# Patient Record
Sex: FEMALE | Race: WHITE | NOT HISPANIC OR LATINO | Employment: OTHER | URBAN - METROPOLITAN AREA
[De-identification: names, ages, dates, MRNs, and addresses within clinical notes are randomized per-mention and may not be internally consistent; named-entity substitution may affect disease eponyms.]

---

## 2018-02-07 ENCOUNTER — HOSPITAL ENCOUNTER (OUTPATIENT)
Dept: RADIOLOGY | Facility: MEDICAL CENTER | Age: 78
End: 2018-02-07

## 2018-02-09 ENCOUNTER — HOSPITAL ENCOUNTER (OUTPATIENT)
Dept: RADIATION ONCOLOGY | Facility: MEDICAL CENTER | Age: 78
End: 2018-02-28
Attending: RADIOLOGY
Payer: MEDICARE

## 2018-02-09 VITALS
SYSTOLIC BLOOD PRESSURE: 162 MMHG | BODY MASS INDEX: 23.72 KG/M2 | OXYGEN SATURATION: 90 % | HEART RATE: 75 BPM | TEMPERATURE: 98.2 F | RESPIRATION RATE: 18 BRPM | DIASTOLIC BLOOD PRESSURE: 75 MMHG | HEIGHT: 62 IN | WEIGHT: 128.9 LBS

## 2018-02-09 DIAGNOSIS — R91.1 LUNG NODULE: ICD-10-CM

## 2018-02-09 DIAGNOSIS — R91.1 LUNG NODULE, SOLITARY: ICD-10-CM

## 2018-02-09 PROCEDURE — 99214 OFFICE O/P EST MOD 30 MIN: CPT | Performed by: RADIOLOGY

## 2018-02-09 PROCEDURE — 99205 OFFICE O/P NEW HI 60 MIN: CPT | Performed by: RADIOLOGY

## 2018-02-09 RX ORDER — MAGNESIUM OXIDE 400 MG/1
400 TABLET ORAL DAILY
COMMUNITY

## 2018-02-09 RX ORDER — ISOSORBIDE DINITRATE 30 MG/1
30 TABLET ORAL 4 TIMES DAILY
COMMUNITY

## 2018-02-09 RX ORDER — ROSUVASTATIN CALCIUM 20 MG/1
20 TABLET, COATED ORAL EVERY EVENING
COMMUNITY
End: 2018-02-09

## 2018-02-09 RX ORDER — AMLODIPINE BESYLATE 5 MG/1
5 TABLET ORAL DAILY
COMMUNITY
End: 2018-02-09

## 2018-02-09 RX ORDER — LORATADINE 10 MG/1
10 TABLET ORAL DAILY
COMMUNITY

## 2018-02-09 RX ORDER — METOPROLOL SUCCINATE 25 MG/1
25 TABLET, EXTENDED RELEASE ORAL DAILY
COMMUNITY

## 2018-02-09 RX ORDER — OMEPRAZOLE 20 MG/1
20 CAPSULE, DELAYED RELEASE ORAL DAILY
COMMUNITY

## 2018-02-09 RX ORDER — HYDROCHLOROTHIAZIDE 12.5 MG/1
12.5 TABLET ORAL DAILY
COMMUNITY

## 2018-02-09 RX ORDER — LOVASTATIN 20 MG/1
10 TABLET ORAL NIGHTLY
COMMUNITY

## 2018-02-09 NOTE — CONSULTS
RADIATION ONCOLOGY CONSULT    DATE OF SERVICE: 2/9/2018    IDENTIFICATION: A 77 y.o. female with growing nodule left upper lobe poor candidate for biopsy.  She is here at the kind request of Dr. Moon for radiotherapy evaluation.      HISTORY OF PRESENT ILLNESS: Patient has a 20-pack-year smoking history stopped in late 90s. She was noted to have a small nodule in the left upper lobe which has been followed serially with CAT scans. More recent imaging January 2018 shows a near doubling in the size of the left upper lobe mass now measuring 12 mm in long axis. Findings are suspicious for neoplasm. PET/CT recommended.    She is asymptomatic.    PAST MEDICAL HISTORY:   Past Medical History:   Diagnosis Date   • GERD (gastroesophageal reflux disease)    • Hyperlipidemia    • Hypertension    • Lung nodule    • Oxygen dependent        PAST SURGICAL HISTORY:  Past Surgical History:   Procedure Laterality Date   • OTHER      nephrectomy   • PARATHYROIDECTOMY     • STENT PLACEMENT     • TONSILLECTOMY     • TUBAL LIGATION         CURRENT MEDICATIONS:  Current Outpatient Prescriptions   Medication Sig Dispense Refill   • omeprazole (PRILOSEC) 20 MG delayed-release capsule Take 20 mg by mouth every day.     • metoprolol SR (TOPROL XL) 25 MG TABLET SR 24 HR Take 25 mg by mouth every day.     • isosorbide dinitrate (ISORDIL) 30 MG Tab Take 30 mg by mouth 4 times a day.     • Mometasone Furo-Formoterol Fum (DULERA INH) Inhale  by mouth.     • lovastatin (MEVACOR) 20 MG Tab Take 20 mg by mouth every evening.     • loratadine (CLARITIN) 10 MG Tab Take 10 mg by mouth every day.     • hydroCHLOROthiazide (HYDRODIURIL) 12.5 MG tablet Take 12.5 mg by mouth every day.     • magnesium oxide (MAG-OX) 400 MG Tab Take 400 mg by mouth every day.     • aspirin EC (ECOTRIN) 81 MG Tablet Delayed Response Take 81 mg by mouth every day.       No current facility-administered medications for this encounter.        ALLERGIES:    Patient has no  "allergy information on record.    FAMILY HISTORY:    Maternal aunt - cancer of the mouth    SOCIAL HISTORY:     reports that she quit smoking about 22 years ago. She has a 20.00 pack-year smoking history. She has never used smokeless tobacco. She reports that she drinks alcohol. She reports that she does not use drugs.   Patient is , has 4 children and lives in [place], [place]. Patient is a retired cook.    REVIEW OF SYSTEMS:  A review of systems for today's date of service was reviewed and uploaded into the electronic medical record.      Pain Scale: 0-10  Pain Assessement: 8  Pain Location, Orientation and Scale: right knee on/off last couple weeks (patient will contact her PCP for work-up)  What makes the pain better: sitting  What makes the pain worse: walking    Gynecological History:   & Para:   : 5, Para: 4 and Number of Interrupted Pregnancies: 1  Menopause Status: Post  BCP- less then 1 year, HRT - never used      PHYSICAL EXAM:    ECOG PERFORMANCE STATUS:  1= Restricted in physically strenuous activity, but ambulatory and able to carry out work of a light sedentary nature, e.g., light housework, office work.   BP (!) 162/75   Pulse 75   Temp 36.8 °C (98.2 °F)   Resp 18   Ht 1.562 m (5' 1.5\")   Wt 58.5 kg (128 lb 14.4 oz)   SpO2 90%   BMI 23.96 kg/m²   GENERAL: Alert and oriented no acute distress  HEENT:  Pupils are equal, round, and reactive to light.  Extraocular muscles   are intact. Sclerae nonicteric.  Conjunctivae pink.  Oral cavity, tongue   protrudes midline.   NECK:  Supple without evidence of thyromegaly.  NODES:  No peripheral adenopathy of the neck, supraclavicular fossa or axillae   bilaterally.  LUNGS:  Clear to ascultation and resonant to percussion.  HEART:  Regular rate and rhythm.  No murmur appreciated  ABDOMEN:  Soft. No evidence of hepatosplenomegaly.  Positive bowel sounds.  EXTREMITIES:  Without Edema.  NEUROLOGIC:  Cranial nerves II through XII were " intact.  Strength is 5/5 in   lower extremities bilaterally.  DTRs were symmetrical.  There was no focal   sensory deficit appreciated.    RADIOLOGY DATA:        IMPRESSION:    A 77 y.o. with presumed stage I lung cancer left upper lobe.    RECOMMENDATIONS:   Will obtain PET CT to ensure there is hypermetabolic activity within the nodule. If metabolically active and considering serial growth most likely malignancy. Dr. Moon's indicated the patient's poor risk for biopsy. Did discuss using the empiric data to treat as stage I lung cancer with stereotactic body radiotherapy. This will involve delivering 6000 cGy in 5 fractions over 2 weeks. She may experience some rib discomfort 12-18 months post completion of therapy that should resolve spontaneously. I don't anticipate any other adverse effects. After discussions, patient would like to proceed. She'll undergo PET scanning next week and return for simulation February 20 with treatment anticipated to get started by February 28.    One hour was spent face-to-face with patient in the office and more than half of that time was spent counseling patient or coordinating care as described above.    Thank you for the opportunity to participate in her care.  If any questions or comments, please do not hesitate in calling.    Dawna MELTON M.D.  Electronically signed by: Dawna Kerr V, 2/9/2018 2:39 PM  [phone]

## 2018-02-09 NOTE — NON-PROVIDER
"Patient was seen today in clinic with Dr. Kerr for Lung Nodule.  Vitals signs and weight were obtained and pain assessment was completed.  Allergies and medications were reviewed with the patient.  Review of systems completed.     Vitals/Pain:  Vitals:    02/09/18 1252   BP: (!) 162/75   Pulse: 75   Resp: 18   Temp: 36.8 °C (98.2 °F)   SpO2: 90%   Weight: 58.5 kg (128 lb 14.4 oz)   Height: 1.562 m (5' 1.5\")        Pain Scale: 0-10  Pain Assessement: 8  Pain Location, Orientation and Scale: right knee on/off last couple weeks (patient will contact her PCP for work-up)  What makes the pain better: sitting  What makes the pain worse: walking      Allergies:   Contrast media with iodine [iodine]; Spiriva; and Statins [hmg-coa-r inhibitors]    Current Medications:  Current Outpatient Prescriptions   Medication Sig Dispense Refill   • omeprazole (PRILOSEC) 20 MG delayed-release capsule Take 20 mg by mouth every day.     • metoprolol SR (TOPROL XL) 25 MG TABLET SR 24 HR Take 25 mg by mouth every day.     • isosorbide dinitrate (ISORDIL) 30 MG Tab Take 30 mg by mouth 4 times a day.     • Mometasone Furo-Formoterol Fum (DULERA INH) Inhale  by mouth.     • lovastatin (MEVACOR) 20 MG Tab Take 20 mg by mouth every evening.     • loratadine (CLARITIN) 10 MG Tab Take 10 mg by mouth every day.     • hydroCHLOROthiazide (HYDRODIURIL) 12.5 MG tablet Take 12.5 mg by mouth every day.     • magnesium oxide (MAG-OX) 400 MG Tab Take 400 mg by mouth every day.     • aspirin EC (ECOTRIN) 81 MG Tablet Delayed Response Take 81 mg by mouth every day.       No current facility-administered medications for this encounter.          PCP:  Luann Rogers R.N.  "

## 2018-02-15 ENCOUNTER — HOSPITAL ENCOUNTER (OUTPATIENT)
Dept: RADIOLOGY | Facility: MEDICAL CENTER | Age: 78
End: 2018-02-15
Attending: RADIOLOGY
Payer: MEDICARE

## 2018-02-15 DIAGNOSIS — R91.1 LUNG NODULE, SOLITARY: ICD-10-CM

## 2018-02-15 PROCEDURE — A9552 F18 FDG: HCPCS

## 2018-02-16 ENCOUNTER — PATIENT OUTREACH (OUTPATIENT)
Dept: OTHER | Facility: MEDICAL CENTER | Age: 78
End: 2018-02-16

## 2018-02-20 ENCOUNTER — HOSPITAL ENCOUNTER (OUTPATIENT)
Dept: RADIATION ONCOLOGY | Facility: MEDICAL CENTER | Age: 78
End: 2018-02-20

## 2018-02-20 PROCEDURE — 77334 RADIATION TREATMENT AID(S): CPT | Mod: 26 | Performed by: RADIOLOGY

## 2018-02-20 PROCEDURE — 77470 SPECIAL RADIATION TREATMENT: CPT | Mod: 26 | Performed by: RADIOLOGY

## 2018-02-20 PROCEDURE — 77263 THER RADIOLOGY TX PLNG CPLX: CPT | Performed by: RADIOLOGY

## 2018-02-20 PROCEDURE — 77470 SPECIAL RADIATION TREATMENT: CPT | Performed by: RADIOLOGY

## 2018-02-20 PROCEDURE — 77334 RADIATION TREATMENT AID(S): CPT | Performed by: RADIOLOGY

## 2018-02-20 PROCEDURE — 77290 THER RAD SIMULAJ FIELD CPLX: CPT | Mod: 26 | Performed by: RADIOLOGY

## 2018-02-20 PROCEDURE — 77290 THER RAD SIMULAJ FIELD CPLX: CPT | Performed by: RADIOLOGY

## 2018-02-21 ENCOUNTER — PATIENT OUTREACH (OUTPATIENT)
Dept: OTHER | Facility: MEDICAL CENTER | Age: 78
End: 2018-02-21

## 2018-02-21 NOTE — PROGRESS NOTES
"Call placed to patient for follow up on cancer nurse navigation letter that was sent.  Pt reporting \"everyone has been wonderful and explained everything\".  Pt denies needs or questions at this time. Provided contact information if needs arise.  "

## 2018-02-22 PROCEDURE — 77370 RADIATION PHYSICS CONSULT: CPT | Performed by: RADIOLOGY

## 2018-02-26 PROCEDURE — 77295 3-D RADIOTHERAPY PLAN: CPT | Mod: 26 | Performed by: RADIOLOGY

## 2018-02-26 PROCEDURE — 77334 RADIATION TREATMENT AID(S): CPT | Mod: 26 | Performed by: RADIOLOGY

## 2018-02-26 PROCEDURE — 77295 3-D RADIOTHERAPY PLAN: CPT | Performed by: RADIOLOGY

## 2018-02-26 PROCEDURE — 77293 RESPIRATOR MOTION MGMT SIMUL: CPT | Mod: 26 | Performed by: RADIOLOGY

## 2018-02-26 PROCEDURE — 77334 RADIATION TREATMENT AID(S): CPT | Performed by: RADIOLOGY

## 2018-02-26 PROCEDURE — 77293 RESPIRATOR MOTION MGMT SIMUL: CPT | Performed by: RADIOLOGY

## 2018-02-26 PROCEDURE — 77300 RADIATION THERAPY DOSE PLAN: CPT | Performed by: RADIOLOGY

## 2018-02-26 PROCEDURE — 77300 RADIATION THERAPY DOSE PLAN: CPT | Mod: 26 | Performed by: RADIOLOGY

## 2018-02-28 ENCOUNTER — HOSPITAL ENCOUNTER (OUTPATIENT)
Dept: RADIATION ONCOLOGY | Facility: MEDICAL CENTER | Age: 78
End: 2018-02-28

## 2018-03-02 ENCOUNTER — HOSPITAL ENCOUNTER (OUTPATIENT)
Dept: RADIATION ONCOLOGY | Facility: MEDICAL CENTER | Age: 78
End: 2018-03-31
Attending: RADIOLOGY
Payer: MEDICARE

## 2018-03-07 ENCOUNTER — HOSPITAL ENCOUNTER (OUTPATIENT)
Dept: RADIATION ONCOLOGY | Facility: MEDICAL CENTER | Age: 78
End: 2018-03-07

## 2018-03-07 PROCEDURE — 77280 THER RAD SIMULAJ FIELD SMPL: CPT | Performed by: RADIOLOGY

## 2018-03-07 PROCEDURE — 77373 STRTCTC BDY RAD THER TX DLVR: CPT | Performed by: RADIOLOGY

## 2018-03-07 PROCEDURE — 77435 SBRT MANAGEMENT: CPT | Performed by: RADIOLOGY

## 2018-03-07 PROCEDURE — 77280 THER RAD SIMULAJ FIELD SMPL: CPT | Mod: 26 | Performed by: RADIOLOGY

## 2018-03-09 ENCOUNTER — HOSPITAL ENCOUNTER (OUTPATIENT)
Dept: RADIATION ONCOLOGY | Facility: MEDICAL CENTER | Age: 78
End: 2018-03-09

## 2018-03-09 PROCEDURE — 77373 STRTCTC BDY RAD THER TX DLVR: CPT | Performed by: RADIOLOGY

## 2018-03-09 PROCEDURE — 77280 THER RAD SIMULAJ FIELD SMPL: CPT | Performed by: RADIOLOGY

## 2018-03-09 PROCEDURE — 77280 THER RAD SIMULAJ FIELD SMPL: CPT | Mod: 26 | Performed by: RADIOLOGY

## 2018-03-12 PROCEDURE — 77373 STRTCTC BDY RAD THER TX DLVR: CPT | Performed by: RADIOLOGY

## 2018-03-12 PROCEDURE — 77280 THER RAD SIMULAJ FIELD SMPL: CPT | Performed by: RADIOLOGY

## 2018-03-12 PROCEDURE — 77336 RADIATION PHYSICS CONSULT: CPT | Mod: XU | Performed by: RADIOLOGY

## 2018-03-12 PROCEDURE — 77280 THER RAD SIMULAJ FIELD SMPL: CPT | Mod: 26 | Performed by: RADIOLOGY

## 2018-03-14 ENCOUNTER — HOSPITAL ENCOUNTER (OUTPATIENT)
Dept: RADIATION ONCOLOGY | Facility: MEDICAL CENTER | Age: 78
End: 2018-03-14

## 2018-03-14 DIAGNOSIS — R91.1 LUNG NODULE, SOLITARY: ICD-10-CM

## 2018-03-14 PROCEDURE — 77373 STRTCTC BDY RAD THER TX DLVR: CPT | Performed by: RADIOLOGY

## 2018-03-14 PROCEDURE — 77280 THER RAD SIMULAJ FIELD SMPL: CPT | Mod: 26 | Performed by: RADIOLOGY

## 2018-03-14 PROCEDURE — 77280 THER RAD SIMULAJ FIELD SMPL: CPT | Performed by: RADIOLOGY

## 2018-03-19 PROCEDURE — 77373 STRTCTC BDY RAD THER TX DLVR: CPT | Performed by: RADIOLOGY

## 2018-03-19 PROCEDURE — 77280 THER RAD SIMULAJ FIELD SMPL: CPT | Performed by: RADIOLOGY

## 2018-03-19 PROCEDURE — 77280 THER RAD SIMULAJ FIELD SMPL: CPT | Mod: 26 | Performed by: RADIOLOGY

## 2018-06-14 ENCOUNTER — HOSPITAL ENCOUNTER (OUTPATIENT)
Dept: RADIATION ONCOLOGY | Facility: MEDICAL CENTER | Age: 78
End: 2018-06-14
Attending: RADIOLOGY
Payer: MEDICARE

## 2018-06-14 ENCOUNTER — HOSPITAL ENCOUNTER (OUTPATIENT)
Dept: RADIOLOGY | Facility: MEDICAL CENTER | Age: 78
End: 2018-06-14
Attending: RADIOLOGY
Payer: MEDICARE

## 2018-06-14 ENCOUNTER — PATIENT OUTREACH (OUTPATIENT)
Dept: OTHER | Facility: MEDICAL CENTER | Age: 78
End: 2018-06-14

## 2018-06-14 VITALS
SYSTOLIC BLOOD PRESSURE: 137 MMHG | OXYGEN SATURATION: 90 % | BODY MASS INDEX: 23.89 KG/M2 | DIASTOLIC BLOOD PRESSURE: 56 MMHG | TEMPERATURE: 98.1 F | HEART RATE: 73 BPM | WEIGHT: 128.5 LBS

## 2018-06-14 DIAGNOSIS — C34.12 MALIGNANT NEOPLASM OF UPPER LOBE OF LEFT LUNG (HCC): ICD-10-CM

## 2018-06-14 DIAGNOSIS — R91.1 LUNG NODULE, SOLITARY: ICD-10-CM

## 2018-06-14 PROCEDURE — 99212 OFFICE O/P EST SF 10 MIN: CPT | Mod: 25 | Performed by: RADIOLOGY

## 2018-06-14 PROCEDURE — 71250 CT THORAX DX C-: CPT

## 2018-06-14 ASSESSMENT — PAIN SCALES - GENERAL: PAINLEVEL: NO PAIN

## 2018-06-14 NOTE — PROGRESS NOTES
"Met with patient and family after her follow up with radiation oncologist.   Reviewed cancer treatment summary and survivorship care plan.  Pt asking questions regarding CT scans.  Encouraged to coordinate with cardiologist if both need scans for follow up.  Answered questions and provided education.  Pt denies persistent symptoms or side effects from treatment at this time.  Pt said is doing \"much better\" and does not feel that she has very much anxiety or worry at this time.  She was not able to provide number.  Pt saying she recently lost her daughter and was also trying to handle concerns regarding lung mass and treatment.  Pt saying she has a good counselor and great family support.  Family member acknowledge this.  Pt denies other concerns at this time.  Survivorship care plan and folder of information provided.  Pt has completed cancer nurse navigation.    "

## 2018-06-14 NOTE — NON-PROVIDER
Patient was seen today in clinic with Dr. Kerr for follow up.  Vitals signs and weight were obtained and pain assessment was completed.  Allergies and medications were reviewed with the patient.  Review of systems completed.     Vitals/Pain:  Vitals:    06/14/18 1404   BP: 137/56   Pulse: 73   Temp: 36.7 °C (98.1 °F)   SpO2: 90%   Weight: 58.3 kg (128 lb 8 oz)   Pain Score: No pain        Allergies:   Contrast media with iodine [iodine]; Spiriva; and Statins [hmg-coa-r inhibitors]    Current Medications:  Current Outpatient Prescriptions   Medication Sig Dispense Refill   • metoprolol SR (TOPROL XL) 25 MG TABLET SR 24 HR Take 25 mg by mouth every day.     • isosorbide dinitrate (ISORDIL) 30 MG Tab Take 30 mg by mouth 4 times a day.     • lovastatin (MEVACOR) 20 MG Tab Take 20 mg by mouth every evening.     • hydroCHLOROthiazide (HYDRODIURIL) 12.5 MG tablet Take 12.5 mg by mouth every day.     • magnesium oxide (MAG-OX) 400 MG Tab Take 400 mg by mouth every day.     • aspirin EC (ECOTRIN) 81 MG Tablet Delayed Response Take 81 mg by mouth every day.     • omeprazole (PRILOSEC) 20 MG delayed-release capsule Take 20 mg by mouth every day.     • Mometasone Furo-Formoterol Fum (DULERA INH) Inhale  by mouth.     • loratadine (CLARITIN) 10 MG Tab Take 10 mg by mouth every day.       No current facility-administered medications for this encounter.          PCP:  Luann Johns, [facility]

## 2018-06-14 NOTE — PROGRESS NOTES
RADIATION ONCOLOGY FOLLOW-UP    DATE OF SERVICE: 6/14/2018    IDENTIFICATION:   A 77 y.o. female with lung cancer left upper lobe based on PET criteria. She is status post stereotactic body radiotherapy 6000 cGy in 5 fractions completed 3/19/2018    HISTORY OF PRESENT ILLNESS:   Returns today for initial follow-up post therapy with CT chest without contrast. Patient has actually no complaints post therapy. Denies any changes in respiratory status. Denies pain.    CURRENT MEDICATIONS:  Current Outpatient Prescriptions   Medication Sig Dispense Refill   • metoprolol SR (TOPROL XL) 25 MG TABLET SR 24 HR Take 25 mg by mouth every day.     • isosorbide dinitrate (ISORDIL) 30 MG Tab Take 30 mg by mouth 4 times a day.     • lovastatin (MEVACOR) 20 MG Tab Take 20 mg by mouth every evening.     • hydroCHLOROthiazide (HYDRODIURIL) 12.5 MG tablet Take 12.5 mg by mouth every day.     • magnesium oxide (MAG-OX) 400 MG Tab Take 400 mg by mouth every day.     • aspirin EC (ECOTRIN) 81 MG Tablet Delayed Response Take 81 mg by mouth every day.     • omeprazole (PRILOSEC) 20 MG delayed-release capsule Take 20 mg by mouth every day.     • Mometasone Furo-Formoterol Fum (DULERA INH) Inhale  by mouth.     • loratadine (CLARITIN) 10 MG Tab Take 10 mg by mouth every day.       No current facility-administered medications for this encounter.        ALLERGIES:  Contrast media with iodine [iodine]; Spiriva; and Statins [hmg-coa-r inhibitors]    REVIEW OF SYSTEMS:  A review of systems for today's date of service was reviewed and uploaded into the electronic medical record.    PHYSICAL EXAM:   /56   Pulse 73   Temp 36.7 °C (98.1 °F)   Wt 58.3 kg (128 lb 8 oz)   SpO2 90%   BMI 23.89 kg/m²     GENERAL: Alert and oriented no acute distress  HEENT:  Pupils are equal, round, and reactive to light.  Extraocular muscles   are intact. Sclerae nonicteric.  Conjunctivae pink.  Oral cavity, tongue   protrudes midline.   NECK:  Supple without  evidence of thyromegaly.  NODES:  No peripheral adenopathy of the neck, supraclavicular fossa or axillae   bilaterally.  LUNGS:  Clear to ascultation and resonant to percussion.  HEART:  Regular rate and rhythm.  No murmur appreciated      RADIOLOGY DATA:     Results for orders placed during the hospital encounter of 06/14/18   CT-CHEST (THORAX) W/O    Impression 8.6 mm lobulated nodule left upper lobe unchanged.  Hyperexpanded lungs. Apical blebs and pleural thickening similar to previous findings.  Atherosclerotic changes.  Moderate right hydronephrosis incompletely imaged.             IMPRESSION:    A 77 y.o. with lung cancer left upper lobe status post stereotactic radiotherapy    RECOMMENDATIONS:   Reviewed CT images using image fusion with treatment planning CT. Comparing to data sets there has been reduction in the size of treated mass. I reassured her. Recommended repeat CT in 4 months with a follow-up post imaging. CT date of burned on disc for Dr. Moon.    25 minutes was spent face-to-face with patient in the office and more than half of that time was spent counseling patient or coordinating care as described above.    Thank you for the opportunity to participate in her care.  If any questions or comments, please do not hesitate in calling.    Dawna MELTON M.D.  Electronically signed by: Dawna Kerr V, 6/14/2018 3:31 PM  [phone]

## 2018-10-18 ENCOUNTER — HOSPITAL ENCOUNTER (OUTPATIENT)
Dept: RADIATION ONCOLOGY | Facility: MEDICAL CENTER | Age: 78
End: 2018-10-31
Attending: RADIOLOGY
Payer: MEDICARE

## 2018-10-18 ENCOUNTER — HOSPITAL ENCOUNTER (OUTPATIENT)
Dept: RADIOLOGY | Facility: MEDICAL CENTER | Age: 78
End: 2018-10-18
Attending: RADIOLOGY
Payer: MEDICARE

## 2018-10-18 VITALS
SYSTOLIC BLOOD PRESSURE: 116 MMHG | WEIGHT: 128 LBS | BODY MASS INDEX: 23.79 KG/M2 | OXYGEN SATURATION: 92 % | HEART RATE: 84 BPM | TEMPERATURE: 97.7 F | DIASTOLIC BLOOD PRESSURE: 57 MMHG

## 2018-10-18 DIAGNOSIS — C34.12 MALIGNANT NEOPLASM OF UPPER LOBE OF LEFT LUNG (HCC): ICD-10-CM

## 2018-10-18 PROCEDURE — 99212 OFFICE O/P EST SF 10 MIN: CPT | Performed by: RADIOLOGY

## 2018-10-18 PROCEDURE — 71250 CT THORAX DX C-: CPT

## 2018-10-18 PROCEDURE — 99214 OFFICE O/P EST MOD 30 MIN: CPT | Performed by: RADIOLOGY

## 2018-10-18 ASSESSMENT — PAIN SCALES - GENERAL
PAINLEVEL: NO PAIN
PAINLEVEL: NO PAIN

## 2018-10-18 NOTE — NON-PROVIDER
Patient was seen today in clinic with Dr. Kerr for follow up.  Vitals signs and weight were obtained and pain assessment was completed.  Allergies and medications were reviewed with the patient.  Toxicities of treatment assessed.     Vitals/Pain:  Vitals:    10/18/18 1406 10/18/18 1407   BP:  116/57   BP Location:  Right arm   Patient Position:  Sitting   BP Cuff Size:  Adult   Pulse:  84   Temp:  36.5 °C (97.7 °F)   TempSrc:  Temporal   SpO2:  92%   Weight: 58.1 kg (128 lb) 58.1 kg (128 lb)   Pain Score: No pain        Allergies:   Contrast media with iodine [iodine]; Spiriva; and Statins [hmg-coa-r inhibitors]    Current Medications:  Current Outpatient Prescriptions   Medication Sig Dispense Refill   • omeprazole (PRILOSEC) 20 MG delayed-release capsule Take 20 mg by mouth every day.     • metoprolol SR (TOPROL XL) 25 MG TABLET SR 24 HR Take 25 mg by mouth every day.     • isosorbide dinitrate (ISORDIL) 30 MG Tab Take 30 mg by mouth 4 times a day.     • Mometasone Furo-Formoterol Fum (DULERA INH) Inhale  by mouth.     • lovastatin (MEVACOR) 20 MG Tab Take 20 mg by mouth every evening.     • loratadine (CLARITIN) 10 MG Tab Take 10 mg by mouth every day.     • hydroCHLOROthiazide (HYDRODIURIL) 12.5 MG tablet Take 12.5 mg by mouth every day.     • magnesium oxide (MAG-OX) 400 MG Tab Take 400 mg by mouth every day.     • aspirin EC (ECOTRIN) 81 MG Tablet Delayed Response Take 81 mg by mouth every day.       No current facility-administered medications for this encounter.          PCP:  Nguyễn Lisa Ass't
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
## 2024-01-03 ENCOUNTER — OFFICE VISIT (OUTPATIENT)
Dept: URGENT CARE | Facility: CLINIC | Age: 84
End: 2024-01-03
Payer: MEDICARE

## 2024-01-03 VITALS
HEIGHT: 61 IN | HEART RATE: 93 BPM | SYSTOLIC BLOOD PRESSURE: 132 MMHG | RESPIRATION RATE: 16 BRPM | WEIGHT: 114 LBS | BODY MASS INDEX: 21.52 KG/M2 | TEMPERATURE: 97.8 F | OXYGEN SATURATION: 90 % | DIASTOLIC BLOOD PRESSURE: 82 MMHG

## 2024-01-03 DIAGNOSIS — R06.02 SHORTNESS OF BREATH: ICD-10-CM

## 2024-01-03 DIAGNOSIS — R09.02 HYPOXIA: ICD-10-CM

## 2024-01-03 PROCEDURE — 3075F SYST BP GE 130 - 139MM HG: CPT | Performed by: NURSE PRACTITIONER

## 2024-01-03 PROCEDURE — 99203 OFFICE O/P NEW LOW 30 MIN: CPT | Performed by: NURSE PRACTITIONER

## 2024-01-03 PROCEDURE — 3079F DIAST BP 80-89 MM HG: CPT | Performed by: NURSE PRACTITIONER

## 2024-01-03 ASSESSMENT — FIBROSIS 4 INDEX: FIB4 SCORE: 1.26

## 2024-01-05 ASSESSMENT — ENCOUNTER SYMPTOMS
COUGH: 1
SPUTUM PRODUCTION: 1
WHEEZING: 0
SHORTNESS OF BREATH: 1

## 2024-01-05 NOTE — PROGRESS NOTES
Subjective:     Ashlee Vilchis is a 83 y.o. female who presents for Cough (Patient having heavy breathing, coughing,  x 2 days patient is on oxygen at home )      Cough  Associated symptoms include shortness of breath. Pertinent negatives include no wheezing.     Pt presents for evaluation of a new problem.  Ashlee is an 83-year-old female presents to urgent care today along with her daughter with complaints of shortness of breath.  She does suffer from COPD and hypoxia and does have supplemental oxygen at home as needed.  She notes that she uses 2 L at night and uses supplemental oxygen during the day as needed.  She recently traveled home from [place] along with her daughter with complaints of shortness of breath and inability to take a complete breath.  No recent viral symptoms including fever/chills, sore throat or nausea/vomiting/diarrhea.  She denies any known ill contacts.  She does suffer from a chronic productive cough.  She presents here without her oxygen.  O2 saturation 78% on room air.  She was immediately placed on 4 L nasal cannula where her oxygen did increase to 90% on room air.  She does state that her breathing is slightly improved within creased capacity.  She does state that there feels as though there is a blockage in her lungs preventing her from taking a full breath in.  She denies any chest pain.    Review of Systems   Respiratory:  Positive for cough, sputum production and shortness of breath. Negative for wheezing.        PMH:   Past Medical History:   Diagnosis Date    GERD (gastroesophageal reflux disease)     Hyperlipidemia     Hypertension     Lung nodule     Oxygen dependent      ALLERGIES:   Allergies   Allergen Reactions    Contrast Media With Iodine [Iodine]      Pt said she only has one kidney and was told not to have IV contrast    Spiriva Unspecified     .    Statins [Hmg-Coa-R Inhibitors]      Pt able to tolerate Lovastatin     SURGHX:   Past Surgical History:  "  Procedure Laterality Date    OTHER      nephrectomy    PARATHYROIDECTOMY      STENT PLACEMENT      TONSILLECTOMY      TUBAL LIGATION       SOCHX:   Social History     Socioeconomic History    Marital status:    Occupational History    Occupation: retired  - [employer]   Tobacco Use    Smoking status: Former     Current packs/day: 0.00     Average packs/day: 1 pack/day for 20.0 years (20.0 ttl pk-yrs)     Types: Cigarettes     Start date: 1976     Quit date: 1996     Years since quittin.9    Smokeless tobacco: Never   Substance and Sexual Activity    Alcohol use: Yes     Comment: socially    Drug use: No     FH:   Family History   Problem Relation Age of Onset    Cancer Maternal Aunt         cancer of the mouth         Objective:   /82   Pulse 93   Temp 36.6 °C (97.8 °F) (Temporal)   Resp 16   Ht 1.549 m (5' 1\")   Wt 51.7 kg (114 lb)   SpO2 90% Comment: on 2 liters of o2 privoder notified  BMI 21.54 kg/m²     Physical Exam  Vitals and nursing note reviewed.   Constitutional:       General: She is not in acute distress.     Appearance: Normal appearance. She is normal weight. She is not ill-appearing or toxic-appearing.   HENT:      Head: Normocephalic.      Right Ear: External ear normal.      Left Ear: External ear normal.      Nose: No congestion or rhinorrhea.      Mouth/Throat:      Pharynx: No oropharyngeal exudate or posterior oropharyngeal erythema.   Eyes:      General:         Right eye: No discharge.         Left eye: No discharge.      Pupils: Pupils are equal, round, and reactive to light.   Cardiovascular:      Rate and Rhythm: Normal rate and regular rhythm.   Pulmonary:      Effort: Pulmonary effort is normal.      Breath sounds: Examination of the right-upper field reveals decreased breath sounds. Examination of the left-upper field reveals decreased breath sounds. Examination of the right-middle field reveals decreased breath sounds. Examination of the left-middle field " reveals decreased breath sounds. Examination of the right-lower field reveals decreased breath sounds. Examination of the left-lower field reveals decreased breath sounds. Decreased breath sounds present.   Abdominal:      General: Abdomen is flat.   Musculoskeletal:         General: Normal range of motion.      Cervical back: Normal range of motion and neck supple.   Skin:     General: Skin is dry.   Neurological:      General: No focal deficit present.      Mental Status: She is alert and oriented to person, place, and time. Mental status is at baseline.   Psychiatric:         Mood and Affect: Mood normal.         Behavior: Behavior normal.         Thought Content: Thought content normal.         Judgment: Judgment normal.         Assessment/Plan:   Assessment    1. Hypoxia        2. Shortness of breath          We discussed performing chest x-ray and viral testing in the clinic today however, due to patient's hypoxia and shortness of breath she was advised that she would have to be transferred to emergency room for higher level of care.  Concern at this time for severe COPD exacerbation versus mucous plug versus pneumonia.  Patient and daughter would like to forego any testing and diagnostics in the clinic today and seek care at [facility] emergency room up the street.  I did advise EMS for transport due to hypoxia.  Transport services were refused.   AVS handout given and reviewed with patient. Pt educated on red flags and when to seek treatment back in ER or UC.